# Patient Record
Sex: FEMALE | Race: ASIAN | URBAN - METROPOLITAN AREA
[De-identification: names, ages, dates, MRNs, and addresses within clinical notes are randomized per-mention and may not be internally consistent; named-entity substitution may affect disease eponyms.]

---

## 2019-02-28 ENCOUNTER — ANESTHESIA (OUTPATIENT)
Dept: OBGYN | Facility: CLINIC | Age: 29
End: 2019-02-28
Payer: COMMERCIAL

## 2019-02-28 ENCOUNTER — ANESTHESIA EVENT (OUTPATIENT)
Dept: OBGYN | Facility: CLINIC | Age: 29
End: 2019-02-28
Payer: COMMERCIAL

## 2019-02-28 ENCOUNTER — HOSPITAL ENCOUNTER (INPATIENT)
Facility: CLINIC | Age: 29
LOS: 2 days | Discharge: HOME | End: 2019-03-02
Attending: OBSTETRICS & GYNECOLOGY | Admitting: OBSTETRICS & GYNECOLOGY
Payer: COMMERCIAL

## 2019-02-28 PROBLEM — Z37.9 NORMAL LABOR: Status: ACTIVE | Noted: 2019-02-28

## 2019-02-28 PROBLEM — Z36.89 ENCOUNTER FOR TRIAGE IN PREGNANT PATIENT: Status: ACTIVE | Noted: 2019-02-28

## 2019-02-28 LAB
ABO + RH BLD: NORMAL
ABO + RH BLD: NORMAL
ALBUMIN SERPL-MCNC: 2.4 G/DL (ref 3.4–5)
ALBUMIN UR-MCNC: NEGATIVE MG/DL
ALP SERPL-CCNC: 189 U/L (ref 40–150)
ALT SERPL W P-5'-P-CCNC: 12 U/L (ref 0–50)
ANION GAP SERPL CALCULATED.3IONS-SCNC: 10 MMOL/L (ref 3–14)
APPEARANCE UR: CLEAR
AST SERPL W P-5'-P-CCNC: 14 U/L (ref 0–45)
BACTERIA #/AREA URNS HPF: ABNORMAL /HPF
BILIRUB SERPL-MCNC: 0.2 MG/DL (ref 0.2–1.3)
BILIRUB UR QL STRIP: NEGATIVE
BUN SERPL-MCNC: 6 MG/DL (ref 7–30)
CALCIUM SERPL-MCNC: 8.1 MG/DL (ref 8.5–10.1)
CHLORIDE SERPL-SCNC: 110 MMOL/L (ref 94–109)
CO2 SERPL-SCNC: 20 MMOL/L (ref 20–32)
COLOR UR AUTO: YELLOW
CREAT SERPL-MCNC: 0.52 MG/DL (ref 0.52–1.04)
GFR SERPL CREATININE-BSD FRML MDRD: >90 ML/MIN/{1.73_M2}
GLUCOSE SERPL-MCNC: 111 MG/DL (ref 70–99)
GLUCOSE UR STRIP-MCNC: NEGATIVE MG/DL
HBV SURFACE AG SERPL QL IA: NONREACTIVE
HGB UR QL STRIP: ABNORMAL
HIV 1+2 AB+HIV1 P24 AG SERPL QL IA: NONREACTIVE
KETONES UR STRIP-MCNC: NEGATIVE MG/DL
LEUKOCYTE ESTERASE UR QL STRIP: NEGATIVE
MUCOUS THREADS #/AREA URNS LPF: PRESENT /LPF
NITRATE UR QL: NEGATIVE
PH UR STRIP: 6 PH (ref 5–7)
POTASSIUM SERPL-SCNC: 3.3 MMOL/L (ref 3.4–5.3)
PROT SERPL-MCNC: 6.7 G/DL (ref 6.8–8.8)
RBC #/AREA URNS AUTO: 1 /HPF (ref 0–2)
SODIUM SERPL-SCNC: 140 MMOL/L (ref 133–144)
SOURCE: ABNORMAL
SP GR UR STRIP: 1.02 (ref 1–1.03)
SPECIMEN EXP DATE BLD: NORMAL
SQUAMOUS #/AREA URNS AUTO: <1 /HPF (ref 0–1)
UROBILINOGEN UR STRIP-MCNC: 0 MG/DL (ref 0–2)
WBC #/AREA URNS AUTO: 3 /HPF (ref 0–5)

## 2019-02-28 PROCEDURE — 0064U ANTB TP TOTAL&RPR IA QUAL: CPT | Performed by: OBSTETRICS & GYNECOLOGY

## 2019-02-28 PROCEDURE — 86900 BLOOD TYPING SEROLOGIC ABO: CPT | Performed by: OBSTETRICS & GYNECOLOGY

## 2019-02-28 PROCEDURE — 40000671 ZZH STATISTIC ANESTHESIA CASE

## 2019-02-28 PROCEDURE — 00HU33Z INSERTION OF INFUSION DEVICE INTO SPINAL CANAL, PERCUTANEOUS APPROACH: ICD-10-PCS | Performed by: ANESTHESIOLOGY

## 2019-02-28 PROCEDURE — 81001 URINALYSIS AUTO W/SCOPE: CPT | Performed by: OBSTETRICS & GYNECOLOGY

## 2019-02-28 PROCEDURE — 88307 TISSUE EXAM BY PATHOLOGIST: CPT | Performed by: OBSTETRICS & GYNECOLOGY

## 2019-02-28 PROCEDURE — 80053 COMPREHEN METABOLIC PANEL: CPT | Performed by: OBSTETRICS & GYNECOLOGY

## 2019-02-28 PROCEDURE — 86901 BLOOD TYPING SEROLOGIC RH(D): CPT | Performed by: OBSTETRICS & GYNECOLOGY

## 2019-02-28 PROCEDURE — 25000125 ZZHC RX 250: Performed by: OBSTETRICS & GYNECOLOGY

## 2019-02-28 PROCEDURE — 27110038 ZZH RX 271: Performed by: ANESTHESIOLOGY

## 2019-02-28 PROCEDURE — 37000011 ZZH ANESTHESIA WARD SERVICE

## 2019-02-28 PROCEDURE — 80307 DRUG TEST PRSMV CHEM ANLYZR: CPT | Performed by: OBSTETRICS & GYNECOLOGY

## 2019-02-28 PROCEDURE — G0463 HOSPITAL OUTPT CLINIC VISIT: HCPCS

## 2019-02-28 PROCEDURE — 87389 HIV-1 AG W/HIV-1&-2 AB AG IA: CPT | Performed by: OBSTETRICS & GYNECOLOGY

## 2019-02-28 PROCEDURE — 25000132 ZZH RX MED GY IP 250 OP 250 PS 637: Performed by: OBSTETRICS & GYNECOLOGY

## 2019-02-28 PROCEDURE — 25000132 ZZH RX MED GY IP 250 OP 250 PS 637

## 2019-02-28 PROCEDURE — 25000128 H RX IP 250 OP 636

## 2019-02-28 PROCEDURE — 36415 COLL VENOUS BLD VENIPUNCTURE: CPT | Performed by: OBSTETRICS & GYNECOLOGY

## 2019-02-28 PROCEDURE — 80353 DRUG SCREENING COCAINE: CPT | Performed by: OBSTETRICS & GYNECOLOGY

## 2019-02-28 PROCEDURE — 72200001 ZZH LABOR CARE VAGINAL DELIVERY SINGLE

## 2019-02-28 PROCEDURE — 12000000 ZZH R&B MED SURG/OB

## 2019-02-28 PROCEDURE — 88307 TISSUE EXAM BY PATHOLOGIST: CPT | Mod: 26 | Performed by: OBSTETRICS & GYNECOLOGY

## 2019-02-28 PROCEDURE — 87340 HEPATITIS B SURFACE AG IA: CPT | Performed by: OBSTETRICS & GYNECOLOGY

## 2019-02-28 PROCEDURE — 3E0R3BZ INTRODUCTION OF ANESTHETIC AGENT INTO SPINAL CANAL, PERCUTANEOUS APPROACH: ICD-10-PCS | Performed by: ANESTHESIOLOGY

## 2019-02-28 PROCEDURE — 25000128 H RX IP 250 OP 636: Performed by: ANESTHESIOLOGY

## 2019-02-28 PROCEDURE — 25800030 ZZH RX IP 258 OP 636: Performed by: ANESTHESIOLOGY

## 2019-02-28 RX ORDER — METHYLERGONOVINE MALEATE 0.2 MG/ML
200 INJECTION INTRAVENOUS
Status: DISCONTINUED | OUTPATIENT
Start: 2019-02-28 | End: 2019-02-28

## 2019-02-28 RX ORDER — NALOXONE HYDROCHLORIDE 0.4 MG/ML
.1-.4 INJECTION, SOLUTION INTRAMUSCULAR; INTRAVENOUS; SUBCUTANEOUS
Status: DISCONTINUED | OUTPATIENT
Start: 2019-02-28 | End: 2019-02-28 | Stop reason: HOSPADM

## 2019-02-28 RX ORDER — BISACODYL 10 MG
10 SUPPOSITORY, RECTAL RECTAL DAILY PRN
Status: DISCONTINUED | OUTPATIENT
Start: 2019-03-02 | End: 2019-03-02 | Stop reason: HOSPADM

## 2019-02-28 RX ORDER — OXYCODONE AND ACETAMINOPHEN 5; 325 MG/1; MG/1
1 TABLET ORAL
Status: DISCONTINUED | OUTPATIENT
Start: 2019-02-28 | End: 2019-02-28

## 2019-02-28 RX ORDER — OXYTOCIN 10 [USP'U]/ML
10 INJECTION, SOLUTION INTRAMUSCULAR; INTRAVENOUS
Status: DISCONTINUED | OUTPATIENT
Start: 2019-02-28 | End: 2019-03-02 | Stop reason: HOSPADM

## 2019-02-28 RX ORDER — FENTANYL CITRATE 50 UG/ML
INJECTION, SOLUTION INTRAMUSCULAR; INTRAVENOUS
Status: COMPLETED
Start: 2019-02-28 | End: 2019-02-28

## 2019-02-28 RX ORDER — ONDANSETRON 2 MG/ML
4 INJECTION INTRAMUSCULAR; INTRAVENOUS EVERY 6 HOURS PRN
Status: DISCONTINUED | OUTPATIENT
Start: 2019-02-28 | End: 2019-02-28 | Stop reason: CLARIF

## 2019-02-28 RX ORDER — CARBOPROST TROMETHAMINE 250 UG/ML
250 INJECTION, SOLUTION INTRAMUSCULAR
Status: DISCONTINUED | OUTPATIENT
Start: 2019-02-28 | End: 2019-03-02 | Stop reason: HOSPADM

## 2019-02-28 RX ORDER — AMOXICILLIN 250 MG
1 CAPSULE ORAL 2 TIMES DAILY
Status: DISCONTINUED | OUTPATIENT
Start: 2019-02-28 | End: 2019-03-02 | Stop reason: HOSPADM

## 2019-02-28 RX ORDER — ACETAMINOPHEN 325 MG/1
650 TABLET ORAL EVERY 4 HOURS PRN
Status: DISCONTINUED | OUTPATIENT
Start: 2019-02-28 | End: 2019-03-02 | Stop reason: HOSPADM

## 2019-02-28 RX ORDER — SODIUM CHLORIDE, SODIUM LACTATE, POTASSIUM CHLORIDE, CALCIUM CHLORIDE 600; 310; 30; 20 MG/100ML; MG/100ML; MG/100ML; MG/100ML
INJECTION, SOLUTION INTRAVENOUS CONTINUOUS
Status: DISCONTINUED | OUTPATIENT
Start: 2019-02-28 | End: 2019-02-28 | Stop reason: CLARIF

## 2019-02-28 RX ORDER — OXYTOCIN/0.9 % SODIUM CHLORIDE 30/500 ML
100-340 PLASTIC BAG, INJECTION (ML) INTRAVENOUS CONTINUOUS PRN
Status: COMPLETED | OUTPATIENT
Start: 2019-02-28 | End: 2019-02-28

## 2019-02-28 RX ORDER — METHYLERGONOVINE MALEATE 0.2 MG/ML
200 INJECTION INTRAVENOUS
Status: DISCONTINUED | OUTPATIENT
Start: 2019-02-28 | End: 2019-03-02 | Stop reason: HOSPADM

## 2019-02-28 RX ORDER — IBUPROFEN 800 MG/1
800 TABLET, FILM COATED ORAL
Status: COMPLETED | OUTPATIENT
Start: 2019-02-28 | End: 2019-02-28

## 2019-02-28 RX ORDER — ACETAMINOPHEN 325 MG/1
650 TABLET ORAL EVERY 4 HOURS PRN
Status: DISCONTINUED | OUTPATIENT
Start: 2019-02-28 | End: 2019-02-28

## 2019-02-28 RX ORDER — IBUPROFEN 600 MG/1
600 TABLET, FILM COATED ORAL EVERY 6 HOURS PRN
Status: DISCONTINUED | OUTPATIENT
Start: 2019-02-28 | End: 2019-03-02 | Stop reason: HOSPADM

## 2019-02-28 RX ORDER — OXYTOCIN/0.9 % SODIUM CHLORIDE 30/500 ML
2-30 PLASTIC BAG, INJECTION (ML) INTRAVENOUS CONTINUOUS
Status: DISCONTINUED | OUTPATIENT
Start: 2019-02-28 | End: 2019-03-02 | Stop reason: HOSPADM

## 2019-02-28 RX ORDER — FERROUS SULFATE 325(65) MG
325 TABLET ORAL DAILY
Status: DISCONTINUED | OUTPATIENT
Start: 2019-02-28 | End: 2019-03-02 | Stop reason: HOSPADM

## 2019-02-28 RX ORDER — MISOPROSTOL 200 UG/1
800 TABLET ORAL
Status: COMPLETED | OUTPATIENT
Start: 2019-02-28 | End: 2019-02-28

## 2019-02-28 RX ORDER — EPHEDRINE SULFATE 50 MG/ML
INJECTION, SOLUTION INTRAMUSCULAR; INTRAVENOUS; SUBCUTANEOUS
Status: DISCONTINUED
Start: 2019-02-28 | End: 2019-02-28 | Stop reason: HOSPADM

## 2019-02-28 RX ORDER — NALOXONE HYDROCHLORIDE 0.4 MG/ML
.1-.4 INJECTION, SOLUTION INTRAMUSCULAR; INTRAVENOUS; SUBCUTANEOUS
Status: DISCONTINUED | OUTPATIENT
Start: 2019-02-28 | End: 2019-02-28 | Stop reason: CLARIF

## 2019-02-28 RX ORDER — BUPIVACAINE HCL/0.9 % NACL/PF 0.125 %
PLASTIC BAG, INJECTION (ML) EPIDURAL CONTINUOUS
Status: DISCONTINUED | OUTPATIENT
Start: 2019-02-28 | End: 2019-02-28 | Stop reason: HOSPADM

## 2019-02-28 RX ORDER — OXYCODONE HYDROCHLORIDE 5 MG/1
5 TABLET ORAL EVERY 4 HOURS PRN
Status: DISCONTINUED | OUTPATIENT
Start: 2019-02-28 | End: 2019-03-02 | Stop reason: HOSPADM

## 2019-02-28 RX ORDER — OXYTOCIN/0.9 % SODIUM CHLORIDE 30/500 ML
340 PLASTIC BAG, INJECTION (ML) INTRAVENOUS CONTINUOUS PRN
Status: DISCONTINUED | OUTPATIENT
Start: 2019-02-28 | End: 2019-03-02 | Stop reason: HOSPADM

## 2019-02-28 RX ORDER — OXYTOCIN 10 [USP'U]/ML
10 INJECTION, SOLUTION INTRAMUSCULAR; INTRAVENOUS
Status: DISCONTINUED | OUTPATIENT
Start: 2019-02-28 | End: 2019-02-28 | Stop reason: CLARIF

## 2019-02-28 RX ORDER — LANOLIN 100 %
OINTMENT (GRAM) TOPICAL
Status: DISCONTINUED | OUTPATIENT
Start: 2019-02-28 | End: 2019-03-02 | Stop reason: HOSPADM

## 2019-02-28 RX ORDER — NALOXONE HYDROCHLORIDE 0.4 MG/ML
.1-.4 INJECTION, SOLUTION INTRAMUSCULAR; INTRAVENOUS; SUBCUTANEOUS
Status: DISCONTINUED | OUTPATIENT
Start: 2019-02-28 | End: 2019-03-02 | Stop reason: HOSPADM

## 2019-02-28 RX ORDER — AMOXICILLIN 250 MG
2 CAPSULE ORAL 2 TIMES DAILY
Status: DISCONTINUED | OUTPATIENT
Start: 2019-02-28 | End: 2019-03-02 | Stop reason: HOSPADM

## 2019-02-28 RX ORDER — MISOPROSTOL 200 UG/1
TABLET ORAL
Status: COMPLETED
Start: 2019-02-28 | End: 2019-02-28

## 2019-02-28 RX ORDER — NALBUPHINE HYDROCHLORIDE 10 MG/ML
2.5-5 INJECTION, SOLUTION INTRAMUSCULAR; INTRAVENOUS; SUBCUTANEOUS EVERY 6 HOURS PRN
Status: DISCONTINUED | OUTPATIENT
Start: 2019-02-28 | End: 2019-02-28 | Stop reason: HOSPADM

## 2019-02-28 RX ORDER — CARBOPROST TROMETHAMINE 250 UG/ML
250 INJECTION, SOLUTION INTRAMUSCULAR
Status: DISCONTINUED | OUTPATIENT
Start: 2019-02-28 | End: 2019-02-28 | Stop reason: CLARIF

## 2019-02-28 RX ORDER — EPHEDRINE SULFATE 50 MG/ML
5 INJECTION, SOLUTION INTRAMUSCULAR; INTRAVENOUS; SUBCUTANEOUS
Status: DISCONTINUED | OUTPATIENT
Start: 2019-02-28 | End: 2019-02-28 | Stop reason: HOSPADM

## 2019-02-28 RX ORDER — OXYTOCIN/0.9 % SODIUM CHLORIDE 30/500 ML
100 PLASTIC BAG, INJECTION (ML) INTRAVENOUS CONTINUOUS
Status: DISCONTINUED | OUTPATIENT
Start: 2019-02-28 | End: 2019-03-02 | Stop reason: HOSPADM

## 2019-02-28 RX ORDER — HYDROCORTISONE 2.5 %
CREAM (GRAM) TOPICAL 3 TIMES DAILY PRN
Status: DISCONTINUED | OUTPATIENT
Start: 2019-02-28 | End: 2019-03-02 | Stop reason: HOSPADM

## 2019-02-28 RX ORDER — BUPIVACAINE HCL/0.9 % NACL/PF 0.125 %
PLASTIC BAG, INJECTION (ML) EPIDURAL
Status: DISCONTINUED
Start: 2019-02-28 | End: 2019-02-28 | Stop reason: HOSPADM

## 2019-02-28 RX ADMIN — MISOPROSTOL 800 MCG: 200 TABLET ORAL at 15:25

## 2019-02-28 RX ADMIN — ACETAMINOPHEN 650 MG: 325 TABLET, FILM COATED ORAL at 21:11

## 2019-02-28 RX ADMIN — FENTANYL CITRATE 100 MCG: 50 INJECTION, SOLUTION INTRAMUSCULAR; INTRAVENOUS at 14:47

## 2019-02-28 RX ADMIN — IBUPROFEN 800 MG: 800 TABLET, FILM COATED ORAL at 17:29

## 2019-02-28 RX ADMIN — SODIUM CHLORIDE, POTASSIUM CHLORIDE, SODIUM LACTATE AND CALCIUM CHLORIDE: 600; 310; 30; 20 INJECTION, SOLUTION INTRAVENOUS at 14:03

## 2019-02-28 RX ADMIN — OXYTOCIN-SODIUM CHLORIDE 0.9% IV SOLN 30 UNIT/500ML 340 ML/HR: 30-0.9/5 SOLUTION at 15:21

## 2019-02-28 RX ADMIN — SENNOSIDES AND DOCUSATE SODIUM 2 TABLET: 8.6; 5 TABLET ORAL at 21:04

## 2019-02-28 RX ADMIN — FERROUS SULFATE TAB 325 MG (65 MG ELEMENTAL FE) 325 MG: 325 (65 FE) TAB at 21:04

## 2019-02-28 RX ADMIN — IBUPROFEN 600 MG: 600 TABLET ORAL at 23:34

## 2019-02-28 RX ADMIN — Medication: at 14:34

## 2019-02-28 ASSESSMENT — MIFFLIN-ST. JEOR: SCORE: 1371.59

## 2019-02-28 ASSESSMENT — ENCOUNTER SYMPTOMS: DYSRHYTHMIAS: 0

## 2019-02-28 NOTE — PLAN OF CARE
Data: Patient presented to [facility]: 2019 12:14 PM.  Reason for maternal/fetal assessment is uterine contractions, vaginal bleeding. Patient reports contractions started this am but she noted vaginal spotting last evening.  Patient is a .  She stated that she had an ultrasound around 9 weeks at [facility] and was uninsured so she didn't have prenatal care.  Prenatal record at [facility] reviewed. Pregnancy  has been complicated by first trimester cocaine use documented at [facility] and no prenatal care.  Gestational Age 39w0d. Patient was found to be hypertensive. Fetal movement present. Patient denies leaking of vaginal fluid/rupture of membranes, pelvic pressure, nausea, vomiting, headache, visual disturbances, epigastric or URQ pain, significant edema. Support person is present.   Action: Verbal consent for EFM. Triage assessment completed. Bill of rights reviewed.  Response: Patient verbalized agreement with plan. Will contact Dr Aziza Shi with update and further orders.

## 2019-02-28 NOTE — PLAN OF CARE
While discussing her pain management options, patient admitted to using cocaine during the first trimester.  She denied current use.

## 2019-02-28 NOTE — ANESTHESIA PROCEDURE NOTES
Peripheral nerve/Neuraxial procedure note : epidural catheter  Pre-Procedure  Performed by Aaron Guevara DO  Referred by MD KSENIA  Location: OB      Pre-Anesthestic Checklist: patient identified, IV checked, risks and benefits discussed, informed consent, monitors and equipment checked, pre-op evaluation and at physician/surgeon's request    Timeout  Correct Patient: Yes   Correct Procedure: Yes   Correct Site: Yes   Correct Laterality: N/A   Correct Position: Yes   Site Marked: N/A   .   Procedure Documentation    .    Procedure:    Epidural catheter.     .  .       Assessment/Narrative  .  .  . Comments:  .Diagnosis- Anticipated vaginal delivery    Continuous Labor Epidural, indication is for labor pain. Following an discussion of the expectations, benefits and risk (including but not limited to nerve damage, infection, bleeding, spinal headache, partial or failed block)--questions were encouraged and answered. The patient appears to understand, and consents to proceed.     The patient received a fluid bolus per orders    Time-out performed.     The patient was in the sitting position, a PVP prep times three was done in the lumbar area followed by placement of a sterile drape. The skin was then infiltrated with lidocaine. A 17ga Touhy needle was then advanced under a loss of resistance technique until the epidural space was identified. The epidural catheter was then advanced and secured. The catheter was then bolused in divided doses with Bupivicaine 0.25% 12 cc plus Fentanyl 100mcg (2cc), while the patient/fetus was monitored. Infusion orders written and infusion of 0.125% bupivicaine 15cc per hour started.    I or my partner, was immediately available and frequently monitored at necessary intervals.      MARIELA Guevara

## 2019-02-28 NOTE — PROGRESS NOTES
PROGRESS NOTE  -late entry note from 1400 hrs    Lab work up reviewed  Pt now accepting she did use cocaine during pregnancy, states last time during early pregnancy, although she is still positive for cocaine in her UDS.  Rest of lab work up is consistent for electrolyte imbalance most likely due to dehydration  Anemia that was present during initial prenatal visit  - states she did not gain enough weight during this pregnancy  - most likely malnutrition and iron deficiency    - SW consult  - iron supplementation to be started  - continue IV hydration  - cord segment to be collected at time of delivery  - gHTN without severe features as the most likely diagnosis of her elevated BPs  - IUGR  - insufficient prenatal care  - poor social situation  - Tobacco use    Dr. Jarret Shi  [phone]

## 2019-02-28 NOTE — L&D DELIVERY NOTE
DELIVERY NOTE  Yahaira Jenkins is a 29 year old  at 39w0d admitted with first stage of labor, early, no prenatal care, cocaine use during pregnancy, tobacco use, malnutrition, chronic anemia, gHTN and IUGR at bedside ultrasound done today.  under epidural, liveborn female infant, OA position, clear fluid, over intact perineum. Baby info as above. Nose and oropharynx bulb suctioned at maternal abdomen. Both shoulders delivered without complication. There was  no nuchal cord. Baby was then handed to awaiting pediatricians. Placenta spontaneously delivered intact with 3VC. Upon gross examination of placenta there was no evidence of abruption, hematoma, however, there was a small cystic formation that did not seem to communicate with the amnios were baby was in. No cervical, periurethral, vagina or perineal lacerations were present. EBL = 708 secondary to atony, after fundal massage, pitocin 40 mU in 1000 ml of LR and Misoprostol 800 mcg rectally her bleeding got under complete control. Counts correct. Dispo stable in LDR.     - SW consulted  - Hb tomorrow am  - started on iron supplementation  - will need Tdap    Dr. Jarret Shi  [phone]

## 2019-02-28 NOTE — ANESTHESIA PREPROCEDURE EVALUATION
Anesthesia Pre-Procedure Evaluation    Patient: Yahaira Jenkins   MRN: [id number] : 1990          Preoperative Diagnosis: * No surgery found *        Past Medical History:   Diagnosis Date     Abnormal Pap smear 2012     Anemia      Past Surgical History:   Procedure Laterality Date     DILATION AND EVACUATION       Anesthesia Evaluation     .             ROS/MED HX    ENT/Pulmonary:      (-) asthma, sleep apnea and Other pulmonary disease   Neurologic:      (-) Other neuro hx, Dementia and Neuropathy   Cardiovascular:        (-) hypertension, CAD, CHF, arrhythmias, pulmonary hypertension and dyslipidemia   METS/Exercise Tolerance:     Hematologic:     (+) Anemia, -      Musculoskeletal:        (-) arthritis   GI/Hepatic:     (+) GERD      (-) hepatitis   Renal/Genitourinary:      (-) renal disease   Endo:      (-) Type I DM, Type II DM, thyroid disease, chronic steroid usage, other endocrine disorder and obesity   Psychiatric:        (-) psychiatric history   Infectious Disease:  - neg infectious disease ROS       Malignancy:         Other:    (+) no H/O Chronic Pain,                        Physical Exam  Normal systems: cardiovascular and pulmonary    Airway   Mallampati: II  TM distance: >3 FB  Neck ROM: full  Mouth opening: > 3 cm    Dental     Cardiovascular   Rhythm and rate: regular and normal  (-) no murmur    Pulmonary     Other findings: Lab Test        12                       0630          HGB          8.6*           Lab Test        19                       1305          NA           140           POTASSIUM    3.3*          CHLORIDE     110*          CO2          20            BUN          6*            CR           0.52          ANIONGAP     10            STAR          8.1*          GLC          111*                Lab Results   Component Value Date    HGB 8.6 (L) 2012     2019    POTASSIUM 3.3 (L) 2019    CHLORIDE 110 (H) 2019    CO2 20  "02/28/2019    BUN 6 (L) 02/28/2019    CR 0.52 02/28/2019     (H) 02/28/2019    STAR 8.1 (L) 02/28/2019    ALBUMIN 2.4 (L) 02/28/2019    PROTTOTAL 6.7 (L) 02/28/2019    ALT 12 02/28/2019    AST 14 02/28/2019    ALKPHOS 189 (H) 02/28/2019    BILITOTAL 0.2 02/28/2019       Preop Vitals  BP Readings from Last 3 Encounters:   02/28/19 (!) 154/98   04/21/12 111/77   04/09/12 123/77    Pulse Readings from Last 3 Encounters:   04/21/12 65   04/09/12 87   01/22/12 91      Resp Readings from Last 3 Encounters:   04/21/12 20   04/09/12 16    SpO2 Readings from Last 3 Encounters:   No data found for SpO2      Temp Readings from Last 1 Encounters:   04/21/12 97.7  F (36.5  C) (Oral)    Ht Readings from Last 1 Encounters:   02/28/19 1.588 m (5' 2.5\")      Wt Readings from Last 1 Encounters:   02/28/19 68 kg (150 lb)    Estimated body mass index is 27 kg/m  as calculated from the following:    Height as of this encounter: 1.588 m (5' 2.5\").    Weight as of this encounter: 68 kg (150 lb).       Anesthesia Plan      History & Physical Review      ASA Status:  2 .  OB Epidural Asa: 2   NPO Status:  > 2 hours    Plan for Epidural          Postoperative Care      Consents  Anesthetic plan, risks, benefits and alternatives discussed with:  Patient..                 Eric Keys MD                    .  "

## 2019-02-28 NOTE — PLAN OF CARE
Data: Yahaira Jenkins transferred to [facility] via wheelchair at 1740. Baby transferred via parent's arms.  Action: Receiving unit notified of transfer: Yes. Patient and family notified of room change. Report given to Meghna CUEVA RN at 1740. Belongings sent to receiving unit. Accompanied by Registered Nurse. Oriented patient to surroundings. Call light within reach. ID bands double-checked with receiving RN.  Response: Patient tolerated transfer and is stable.

## 2019-02-28 NOTE — H&P
"  February 28, 2019    Yahaira Jenkins  [id number]            OB Admit History & Physical      Ms. Jenkins  is here for rule out labor and vaginal bleeding.    She has noticed uterine contractions that started this morning and have been getting stronger and more frequent. States she also noticed minimal bloody show yesterday evening and this morning. She has not had heavy vaginal bleeding at all. Has not had abnormal discharge and there is +FM..    Of note pt has hx of one OB visit when she established prenatal care back in 07/2018 at [facility]. She had at that point all prenatal work up done which was WNL. She had a dating early US that was consistent for a CRL at 9w0d GA back in 08/2018 and ALEJA was 03/07/2019. She was unfortunately, unable to continue prenatal care throughout this pregnancy. Denies any complications in prior pregnancies. She was induced in her last pregnancy for personal reasons, (elective induction). She is a smoker and states she tried to decrease cigarettes throughout this prengancy currently smoking about 3-4 cig/day.     I personally reviewed her one visit in care everywhere at [facility], I also personally evaluated her during her triage encounter and she was found to have elevated pressures in the mild range. She denies Ha, SOB, CP, LH, RUQ or epigastric pain, scotomata or increased swelling. She has never had blood pressure issues in prior pregnancies or outside pregnancy.     States that the only medication she was on during this pregnancy was PNV's on and off. She had a UDS done during her initial prenatal visit that had presumed cocaine positive. She denies any drug use.     No LMP recorded. Patient is pregnant.   Her Estimated Date of Delivery: Mar 7, 2019, making her 39w0d.      Estimated body mass index is 26.52 kg/m  as calculated from the following:    Height as of 4/19/12: 1.575 m (5' 2\").    Weight as of 4/19/12: 65.8 kg (145 lb).    See prenatal for labs.  unk " GBS, Rubella Immune, RH Positive      She is a 29 year old   Her OB history:   Obstetric History       T2      L2     SAB1   TAB0   Ectopic0   Multiple0   Live Births2       # Outcome Date GA Lbr Willy/2nd Weight Sex Delivery Anes PTL Lv   4 Current            3 Term 12 40w0d 03:30 / 00:19 3.487 kg (7 lb 11 oz) F Vag-Spont EPI N MERE      Name: KODY INGRAM      Apgar1:  8               Apgar5: 10   2 SAB  10w0d          1 Term 2007 40w0d  3.544 kg (7 lb 13 oz)  Vag-Spont EPI  MERE          Past Medical History:   Diagnosis Date     Abnormal Pap smear 2012     Anemia         No past surgical history on file.      No current outpatient medications on file.       Allergies: Patient has no known allergies.      REVIEW OF SYSTEMS:  NEUROLOGIC:  Negative  EYES:  Negative  ENT:  Negative  GI:  Negative  :  Negative  GYN:  Negative  CV:  Negative  PULMONARY:  Negative  MUSCULOSKELETAL:  Negative  PSYCH:  Negative      Social History     Socioeconomic History     Marital status: Single     Spouse name: Not on file     Number of children: Not on file     Years of education: Not on file     Highest education level: Not on file   Occupational History     Not on file   Social Needs     Financial resource strain: Not on file     Food insecurity:     Worry: Not on file     Inability: Not on file     Transportation needs:     Medical: Not on file     Non-medical: Not on file   Tobacco Use     Smoking status: Current Every Day Smoker     Packs/day: 0.25     Last attempt to quit: 2011     Years since quittin.5     Smokeless tobacco: Former User   Substance and Sexual Activity     Alcohol use: No     Drug use: Not on file     Sexual activity: Yes     Partners: Male   Lifestyle     Physical activity:     Days per week: Not on file     Minutes per session: Not on file     Stress: Not on file   Relationships     Social connections:     Talks on phone: Not on file     Gets together: Not on  file     Attends Druze service: Not on file     Active member of club or organization: Not on file     Attends meetings of clubs or organizations: Not on file     Relationship status: Not on file     Intimate partner violence:     Fear of current or ex partner: Not on file     Emotionally abused: Not on file     Physically abused: Not on file     Forced sexual activity: Not on file   Other Topics Concern     Not on file   Social History Narrative     Not on file      No family history on file.      Exam:    Vitals:   BP (!) 146/96   FHT: Reactive    [place]:  q2-3 min    Alert Awake in NAD  HEENT grossly normal  Neck: no lymphadenopathy or thryoidomegaly  Lungs CTAB  Back No CVAT  Heart RR  ABD gravid, NABS, soft, NT on exam with NT fundus palpable  Cervix is 5 cm / 80 % effaced at -3 station, ballotable  EXT:  no edema, no calf tenderness    BSUS  Cephalic, posterior fundal placenta, subjectively normal PSARKLE, MVP of 4.7 cm, Cardiac activity present, EFW is limited based on FL, AC, and HL, fetal head low in pelvis therefore no planes were able to be obtained. Measurements as follows;  - AC 28.1 cm - 32wd GA  - FL 6.50 cm - 33w4d GA  - HL 5.93 cm - 31w4d GA    In average EFW is 2075g consistent with a GA between 33-34 wks. This is consistent with IUGR.       Assessment:    1)  IUP at 39w0d GA  2) early labor  3) PIH  4) insufficient prenatal care  5) smoker  6) positive UDS in early pregnancy  7) IUGR    Plan:    1)  Admission orders in  - RPR, HIV, Hep B antigen, CMP, CBC, ABO type and screen ordered, UA, Urine culture, UDS, cr/prot ratio    2) AOL secondary to gHTN VS pre-E without severe features so far  - I counseled pt her diagnosis will be defined based on lab work up, management could also change and would need prompt delivery if severe features are present in lab work up  - antihypertensive management in case of possible severe range BPs   - counseled for possible use of magnesium sulfate if necessary for  seizure ppx    3) counseled for IUGR  - likely etiologies linked to tobacco use, insufficient parental care, cocaine use  - will need NICU team at deliver time     4) Will need SW consult     All questions answered and pt verbalized understanding.    Plan; transfer pt to LD and initiate pitocin for AOL      Aziza Shi MD  Dept of OB/GYN  February 28, 2019

## 2019-02-28 NOTE — PROVIDER NOTIFICATION
02/28/19 1249   Provider Notification   Provider Name/Title KSENIA   Method of Notification At Bedside   U/S was performed.  VTX presenting with fundal placenta noted.  Suspected IUGR.  POC was discussed and patient agreed to staying for labor.  Questions answered regarding hypertension/preeclampsia.

## 2019-03-01 LAB
ALT SERPL W P-5'-P-CCNC: 11 U/L (ref 0–50)
AST SERPL W P-5'-P-CCNC: 19 U/L (ref 0–45)
ERYTHROCYTE [DISTWIDTH] IN BLOOD BY AUTOMATED COUNT: 15.2 % (ref 10–15)
HCT VFR BLD AUTO: 23.6 % (ref 35–47)
HGB BLD-MCNC: 7.9 G/DL (ref 11.7–15.7)
HGB BLD-MCNC: 8.1 G/DL (ref 11.7–15.7)
MCH RBC QN AUTO: 22.7 PG (ref 26.5–33)
MCHC RBC AUTO-ENTMCNC: 34.3 G/DL (ref 31.5–36.5)
MCV RBC AUTO: 66 FL (ref 78–100)
PLATELET # BLD AUTO: 323 10E9/L (ref 150–450)
RBC # BLD AUTO: 3.57 10E12/L (ref 3.8–5.2)
T PALLIDUM AB SER QL: NONREACTIVE
WBC # BLD AUTO: 10 10E9/L (ref 4–11)

## 2019-03-01 PROCEDURE — 25000132 ZZH RX MED GY IP 250 OP 250 PS 637: Performed by: OBSTETRICS & GYNECOLOGY

## 2019-03-01 PROCEDURE — 12000000 ZZH R&B MED SURG/OB

## 2019-03-01 PROCEDURE — 85018 HEMOGLOBIN: CPT | Performed by: OBSTETRICS & GYNECOLOGY

## 2019-03-01 PROCEDURE — 84450 TRANSFERASE (AST) (SGOT): CPT | Performed by: OBSTETRICS & GYNECOLOGY

## 2019-03-01 PROCEDURE — 85027 COMPLETE CBC AUTOMATED: CPT | Performed by: OBSTETRICS & GYNECOLOGY

## 2019-03-01 PROCEDURE — 36415 COLL VENOUS BLD VENIPUNCTURE: CPT | Performed by: OBSTETRICS & GYNECOLOGY

## 2019-03-01 PROCEDURE — 84460 ALANINE AMINO (ALT) (SGPT): CPT | Performed by: OBSTETRICS & GYNECOLOGY

## 2019-03-01 RX ADMIN — IBUPROFEN 600 MG: 600 TABLET ORAL at 13:51

## 2019-03-01 RX ADMIN — IBUPROFEN 600 MG: 600 TABLET ORAL at 20:29

## 2019-03-01 RX ADMIN — FERROUS SULFATE TAB 325 MG (65 MG ELEMENTAL FE) 325 MG: 325 (65 FE) TAB at 07:51

## 2019-03-01 RX ADMIN — ACETAMINOPHEN 650 MG: 325 TABLET, FILM COATED ORAL at 19:48

## 2019-03-01 RX ADMIN — SENNOSIDES AND DOCUSATE SODIUM 1 TABLET: 8.6; 5 TABLET ORAL at 07:50

## 2019-03-01 RX ADMIN — IBUPROFEN 600 MG: 600 TABLET ORAL at 07:49

## 2019-03-01 RX ADMIN — ACETAMINOPHEN 650 MG: 325 TABLET, FILM COATED ORAL at 01:27

## 2019-03-01 RX ADMIN — ACETAMINOPHEN 650 MG: 325 TABLET, FILM COATED ORAL at 10:20

## 2019-03-01 RX ADMIN — SENNOSIDES AND DOCUSATE SODIUM 1 TABLET: 8.6; 5 TABLET ORAL at 07:57

## 2019-03-01 RX ADMIN — ACETAMINOPHEN 650 MG: 325 TABLET, FILM COATED ORAL at 06:04

## 2019-03-01 RX ADMIN — SENNOSIDES AND DOCUSATE SODIUM 2 TABLET: 8.6; 5 TABLET ORAL at 19:48

## 2019-03-01 RX ADMIN — ACETAMINOPHEN 650 MG: 325 TABLET, FILM COATED ORAL at 13:51

## 2019-03-01 NOTE — PLAN OF CARE
Elevated blood pressures noted during initial assessment this morning.  Elevated blood pressures have continued throughout the shift running in the 140s-130s over 100s-90s.  Dr Varela notified, labs ordered.  VS now every 4 hours, call if over 150 systolic or 100 diastolic x 2, 15 minutes apart per Dr. Varela. Discussed smoking/nicotine patch - patch declined by pt. Parents bonding well with baby and attentive to her needs.  Pt is formula feeding but discussed possibly breastfeeding when Dr. Varela rounded.  Pt will call if breastfeeding assistance is desired.

## 2019-03-01 NOTE — PROGRESS NOTES
Public Health Nurse (PHN) met with patient and FOB, discussed resources within [facility] and [facility].  Provided family resources of [facility] Public Health services resource card, home visiting card, community resource guide and car seat information card given and discussed. Discussed how to self-refer to [facility]. Patient declined any questions or concerns.

## 2019-03-01 NOTE — PLAN OF CARE
VSS. Patient up ad carlos and is independent to void. Pain is well controlled with ibuprofen. SW consult placed d/t cocaine use, smoking, and limited prenatal care during pregnancy. Significant other present at bedside.

## 2019-03-01 NOTE — PROGRESS NOTES
"PPD #1    Yahaira Jenkins is a 29 year old  s/p  yesterday.  No complaints.   Denies headache, epigastric or RUQ pain, edema, visual change.  BPs mildly elevated.  Only had one prenatal visit that I can see in 2018, BP was normal, Urine tox was positive for cocaine then as well.    hgb today 7.9.  I did see a hemoglobin ELP in  [facility] records, hgb E=93%.    Will check cbc and LFTs to be sure no HELLP syndrome.        OBJECTIVE:  Blood pressure (!) 135/98, temperature 97.9  F (36.6  C), temperature source Oral, resp. rate 16, height 1.588 m (5' 2.5\"), weight 68 kg (150 lb), unknown if currently breastfeeding.    WDWN woman in NAD   FF U -2    Hemoglobin   Date Value Ref Range Status   2019 7.9 (L) 11.7 - 15.7 g/dL Final   2012 8.6 (L) 11.7 - 15.7 g/dL Final       ASSESSMENT:  S/P .  Mild hypertension  Cocaine use  Hgb E  Blood loss anemia vs chronic or gestational anemia  Active Problems:    Encounter for triage in pregnant patient (2019)    Normal labor (2019)    Vaginal delivery (2019)        PLAN:  On Fe supplement.  SW involved, baby on 5 day hold.    Monitor BP, check LFTs and CBC.  Discussed contraceptive options.  Pelvic rest advised x 6 weeks.    Continue post partum cares.  Anticipate DC tomorrow.    Carlyn Varela MD 2019   "

## 2019-03-01 NOTE — PROVIDER NOTIFICATION
03/01/19 1039   Provider Notification   Provider Name/Title Jaye   Method of Notification In Department   Notification Reason Vital Signs Change;Status Update   Notified of elevated blood pressures during rounds.  MD will evaluate and update POC if needed.

## 2019-03-01 NOTE — CONSULTS
"      [facility]  MATERNAL CHILD HEALTH   INITIAL PSYCHOSOCIAL ASSESSMENT     DATA:     Reason for Social Work Consult: Minimal prenatal care, tobacco use, + Cocaine use     Presenting Information: Pt Yahaira delivered her 3rd child on 2/28, baby girl Norma Abdullahi. Pt also has an 11-year-old son and a 6-year-old daughter. Pt tested positive for cocaine at delivery. Baby's tox screen is still pending.     Living Situation: Pt resides in a home in [place] with her significant other \"Jesus\" and their 2 children.    Social Support: Pt identifies her mother as a support to her, as well as Jesus's family who resides in the area. Pt's mother Sarah resides in [place] and is currently watching patients 2 children at home.    Employment: Pt is not currently employed, and plans to stay at home with the baby for a few months before seeking employment. LORENZA Lee is employed full-time.     Insurance: Pt reports recently applying for [facility].     Source of Financial Support: LORENZA Lee employed full-time to support the family.     Mental Health History: None identified.    History of Postpartum Mood Disorders: Pt reports that she did not experience any PPD with her first 2 children. She has not yet completed the EPDS, but plans to do so before discharge. No concerns with PPD symptoms noted.     Chemical Health History: Pt tested positive for Cocaine at delivery. CPS report made to [facility]. Pt aware.     Community Resources//Baby Supplies: Pt reports that they have adequate supplies at home for the baby. She plans to stay at home with the baby for at least a few months, and the older children are in school. Day Care not needed at this time.     INTERVENTION:       SW completed chart review and collaborated with the multidisciplinary team.     Psychosocial Assessment     Introduction to Maternal Child Health  role and scope of practice     Reviewed Hospital and Community Resources "     Assessed Chemical Health History and Current Symptoms     Assessed Mental Health History and Current Symptoms     Identified stressors, barriers and family concerns     Provided support and active empathetic listening and validation.    Provided psychoeducation on  mood and anxiety disorders, assessed for any current symptoms or history    Provided brochure Depression and Anxiety During and after Pregnancy.     ASSESSMENT:     Coping: Adequate    Affect: Appropriate, stable, calm    Motivation/Ability to Access Services: Independent in accessing services. PHN referral not made at this time. Pt was given information and plans to self-refer at a later time.     Assessment of Support System: Stable, involved. FOB at bedside and assisting with infant cares. Pt's mother currently watching after pt's other two children.     Family and parent/infant interactions: Parents seem supportive of each other and are bonding with pt as they are able.      PLAN:     SW will continue to follow throughout pt's Maternal-Child Health Journey as needs arise. SW will continue to collaborate with the multidisciplinary team.    JAMES Goff

## 2019-03-01 NOTE — PROGRESS NOTES
Ying from CPS will come to assess pt Monday Morning as baby will be here for 5 days and pt is complaint to hospitalization plan at this time. Contact info for Ying from CPS: [phone]. CPS after hours number to call for any concerns or urgent needs over the weekend: [phone]   resilient/elastic

## 2019-03-01 NOTE — PLAN OF CARE
Stable patient, vitals and fundal checks are WNL. Pt is independent with self and infant cares. Up ambulating in room and is able to empty bladder without issues. Attentive to infant and is bonding well. Significant other is present and supportive.
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
## 2019-03-01 NOTE — ANESTHESIA POSTPROCEDURE EVALUATION
Patient: Yahaira Jenkins    * No procedures listed *    Diagnosis:* No pre-op diagnosis entered *  Diagnosis Additional Information: .Intrauterine Pregnancy, Labor      Anesthesia Type:  Epidural    Note:  Anesthesia Post Evaluation    Patient location during evaluation: Floor  Patient participation: Able to fully participate in evaluation  Level of consciousness: awake  Pain management: adequate  Airway patency: patent  Cardiovascular status: acceptable  Respiratory status: acceptable  Hydration status: euvolemic  PONV: controlled     Anesthetic complications: None    Comments: S/P epidural for labor. I or my partner remained immediately available, monitored the patient, and supervised nursing staff at key events and necessary intervals.    The patient is doing well. VSS Temp normal. Satisfactory cardiovascular and repiratory function. Neuro at baseline. Denies positional headache. Minimal side effects easily managed w/ PRN meds. No apparent anesthetic complications. No follow-up required.    [email or web address]        Last vitals:  Vitals:    03/01/19 0123 03/01/19 0800 03/01/19 0830   BP: 128/81 (!) 147/104 (!) 144/97   Resp: 16 16    Temp: 98.9  F (37.2  C) 97.9  F (36.6  C)          Electronically Signed By: Eric Keys MD  March 1, 2019  8:41 AM

## 2019-03-02 VITALS
HEIGHT: 63 IN | TEMPERATURE: 98.5 F | SYSTOLIC BLOOD PRESSURE: 139 MMHG | BODY MASS INDEX: 26.58 KG/M2 | WEIGHT: 150 LBS | DIASTOLIC BLOOD PRESSURE: 85 MMHG | RESPIRATION RATE: 16 BRPM

## 2019-03-02 PROCEDURE — 25000132 ZZH RX MED GY IP 250 OP 250 PS 637: Performed by: OBSTETRICS & GYNECOLOGY

## 2019-03-02 RX ORDER — IBUPROFEN 600 MG/1
600 TABLET, FILM COATED ORAL EVERY 6 HOURS PRN
Qty: 90 TABLET | Refills: 1 | Status: SHIPPED | OUTPATIENT
Start: 2019-03-02

## 2019-03-02 RX ORDER — FERROUS SULFATE 325(65) MG
325 TABLET, DELAYED RELEASE (ENTERIC COATED) ORAL DAILY
Qty: 60 TABLET | Refills: 0 | Status: SHIPPED | OUTPATIENT
Start: 2019-03-02 | End: 2019-03-02

## 2019-03-02 RX ORDER — AMOXICILLIN 250 MG
1 CAPSULE ORAL 2 TIMES DAILY PRN
Qty: 60 TABLET | Refills: 0 | Status: SHIPPED | OUTPATIENT
Start: 2019-03-02

## 2019-03-02 RX ORDER — ACETAMINOPHEN 325 MG/1
325-650 TABLET ORAL EVERY 6 HOURS PRN
Qty: 100 TABLET | Refills: 0 | Status: SHIPPED | OUTPATIENT
Start: 2019-03-02 | End: 2019-03-02

## 2019-03-02 RX ORDER — AMOXICILLIN 250 MG
1 CAPSULE ORAL 2 TIMES DAILY PRN
Qty: 60 TABLET | Refills: 1 | Status: SHIPPED | OUTPATIENT
Start: 2019-03-02 | End: 2019-03-02

## 2019-03-02 RX ORDER — ACETAMINOPHEN 325 MG/1
325-650 TABLET ORAL EVERY 6 HOURS PRN
Qty: 100 TABLET | Refills: 0 | Status: SHIPPED | OUTPATIENT
Start: 2019-03-02

## 2019-03-02 RX ORDER — FERROUS SULFATE 325(65) MG
325 TABLET, DELAYED RELEASE (ENTERIC COATED) ORAL DAILY
Qty: 60 TABLET | Refills: 0 | Status: SHIPPED | OUTPATIENT
Start: 2019-03-02

## 2019-03-02 RX ADMIN — SENNOSIDES AND DOCUSATE SODIUM 1 TABLET: 8.6; 5 TABLET ORAL at 08:29

## 2019-03-02 RX ADMIN — ACETAMINOPHEN 650 MG: 325 TABLET, FILM COATED ORAL at 04:31

## 2019-03-02 RX ADMIN — ACETAMINOPHEN 650 MG: 325 TABLET, FILM COATED ORAL at 00:10

## 2019-03-02 RX ADMIN — ACETAMINOPHEN 650 MG: 325 TABLET, FILM COATED ORAL at 13:15

## 2019-03-02 RX ADMIN — ACETAMINOPHEN 650 MG: 325 TABLET, FILM COATED ORAL at 08:28

## 2019-03-02 RX ADMIN — IBUPROFEN 600 MG: 600 TABLET ORAL at 10:10

## 2019-03-02 RX ADMIN — IBUPROFEN 600 MG: 600 TABLET ORAL at 04:09

## 2019-03-02 RX ADMIN — FERROUS SULFATE TAB 325 MG (65 MG ELEMENTAL FE) 325 MG: 325 (65 FE) TAB at 08:29

## 2019-03-02 ASSESSMENT — ACTIVITIES OF DAILY LIVING (ADL)
AMBULATION: 0-->INDEPENDENT
RETIRED_EATING: 0-->INDEPENDENT
SWALLOWING: 0-->SWALLOWS FOODS/LIQUIDS WITHOUT DIFFICULTY
TOILETING: 0-->INDEPENDENT
COGNITION: 0 - NO COGNITION ISSUES REPORTED
DRESS: 0-->INDEPENDENT
BATHING: 0-->INDEPENDENT
FALL_HISTORY_WITHIN_LAST_SIX_MONTHS: NO
TRANSFERRING: 0-->INDEPENDENT
RETIRED_COMMUNICATION: 0-->UNDERSTANDS/COMMUNICATES WITHOUT DIFFICULTY

## 2019-03-02 NOTE — DISCHARGE INSTRUCTIONS
"[facility]  [address] [place] [id number]   [phone]    **Call and schedule post-partum visit in 6 weeks.      Yahaira-    Congratulations on your new baby!    A few instructions:    -No tampons/douching/intercourse x 6 weeks.  See your health care provider first, to be sure you are healed.  -If you have stitches, it may help you feel better sooner ifyou can sit in the tub twice daily for a few minutes, for the first week after delivery.  Keep the area clean and dry.  -Call your clinic if fever, worsening pain, or any questions.    Medications:  Ibuprofen 600 mg every 6 hours will be helpful for pain and cramping.  You may want to use a stool softener if needed, available over the counter, like Colace 100 mg twice daily.    Post partum depression:   10-15% of women will have more than the baby blues--if you are feeling very sad or down, having regrets, can't stop thinking about certain thoughts or actions, you could be experiencing post partum depression.  Please call your health care provider to discuss.    Follow up:  Please call your clinic soon to schedule a \"post partum visit\" for 6 weeks from now.   If you need anything by prescription for contraception, they will go over it with you at that visit.    Again, congratulations!      Carlyn Varela MD March 2, 2019       Preeclampsia   Call your doctor right away if you have any of the following:  - Edema (swelling) in your face or hands  - Rapid weight gain-about 1 pound or more in a day  - Headache  - Abdominal pain on your right side  - Vision problems (flashes or spots)  - You have questions or concerns once you return home.    Postpartum Vaginal Delivery Instructions    Activity       Ask family and friends for help when you need it.    Do not place anything in your vagina for 6 weeks.    You are not restricted on other activities, but take it easy for a few weeks to allow your body to recover from delivery.  You are able to " do any activities you feel up to that point.    No driving until you have stopped taking your pain medications (usually two weeks after delivery).     Call your health care provider if you have any of these symptoms:       Increased pain, swelling, redness, or fluid around your stiches from an episiotomy or perineal tear.    A fever above 100.4 F (38 C) with or without chills when placing a thermometer under your tongue.    You soak a sanitary pad with blood within 1 hour, or you see blood clots larger than a golf ball.    Bleeding that lasts more than 6 weeks.    Vaginal discharge that smells bad.    Severe pain, cramping or tenderness in your lower belly area.    A need to urinate more frequently (use the toilet more often), more urgently (use the toilet very quickly), or it burns when you urinate.    Nausea and vomiting.    Redness, swelling or pain around a vein in your leg.    Problems breastfeeding or a red or painful area on your breast.    Chest pain and cough or are gasping for air.    Problems coping with sadness, anxiety, or depression.  If you have any concerns about hurting yourself or the baby, call your provider immediately.     You have questions or concerns after you return home.     Keep your hands clean:  Always wash your hands before touching your perineal area and stitches.  This helps reduce your risk of infection.  If your hands aren't dirty, you may use an alcohol hand-rub to clean your hands. Keep your nails clean and short.

## 2019-03-02 NOTE — PLAN OF CARE
BP mildly elevated. Denies any s/s of pre-e. Ibuprofen, tylenol, and tpump for back discomfort. PIV SL. Denies dizziness or lightheadedness when ambulating. Had a BM. SO at bedside, supportive.

## 2019-03-02 NOTE — PROGRESS NOTES
"PPD #2     Ready for discharge today.  Plans to room in, as baby on 5 day hold due to Yahaira cocaine use.  CPS to come Monday morning.    Yahaira has right back pain consistent with musculoskeletal.  Still has some mild BP elevations, intermittent.  No headache or visual changes.  No epigastric or RUQ pain.  OK to discharge.  Plans bottle feeding.    Labs yesterday wnl.  hgb ELP in 2012 julio, shows Hgb E, could cause very mild anemia.  Discussed with pt.    Plans to continue FE to treat iron deficiency anemia.    OBJECTIVE:  Blood pressure 134/82, temperature 98  F (36.7  C), temperature source Oral, resp. rate 16, height 1.588 m (5' 2.5\"), weight 68 kg (150 lb), unknown if currently breastfeeding.     19 -- -- -- -- -- -- -- -- -- 6 -- EK   19 -- -- -- -- -- -- -- -- -- 5 -- EK   19 -- -- 105 -- 134/82 -- -- -- -- -- -- EK   19 0010 98  F (36.7  C) -- 78 -- 142/90 16 -- -- -- 5 -- EK   19 -- -- -- -- -- -- -- -- -- 7 -- EK   198 -- -- -- -- -- -- -- -- -- 6 -- EK   19 1830 -- -- 90 -- 132/75 -- -- -- -- -- -- SO   19 1600 98  F (36.7  C) -- 93 -- 141/83 16 -- -- -- 3 -- SO   19 1351 -- -- -- -- -- -- -- -- -- 7 -- LD   19 1127 97.9  F (36.6  C) -- 92 -- 135/98 Abnormal  16 -- -- -- 6 -- LD   19 1020 -- -- -- -- -- -- -- -- -- 6 -- LD   19 1015 -- -- 92 -- 143/98 Abnormal  16 -- -- -- -- -- LD   19 0852 -- -- 96 -- 143/90 -- -- -- -- -- -- LD   19 0830 -- -- 95 -- 144/97 Abnormal  -- -- -- -- -- -- LD   19 0800 97.9  F (36.6  C) -- 93 -- 147/104 Abnormal  16 -- -- -- 5 -- LD   19 0749 -- -- -- -- -- -- -- -- -- 5 -- LD   19 0123 98.9  F (37.2  C) -- 96 -- 128/81 16 --            WDWN woman in NAD.  FF U -2    Hemoglobin   Date Value Ref Range Status   2019 8.1 (L) 11.7 - 15.7 g/dL Final   2019 7.9 (L) 11.7 - 15.7 g/dL Final       ASSESSMENT:  S/P  doing well. " Intermittent mild elevation in BP, should improve over time,.    PPD #2  Active Problems:    Encounter for triage in pregnant patient (2/28/2019)    Normal labor (2/28/2019)    Vaginal delivery (2/28/2019)  Anemia-  Continue FE  hgb E--no implications.  Cocaine use--baby on hold, CPS involved.    PLAN:  OK to discharge home today--she plans to room in.  Post partum restrictions and what to expect in puerperium reviewed. Rx Ibuprofen, fe, senna S, tylenol per her request.  RTC 6 weeks for post partum cares, pelvic rest until then.    Carlyn Varela MD March 2, 2019

## 2019-03-02 NOTE — PLAN OF CARE
BPs improving slightly this afternoon, 141/83 and 132/75. Pt denies all PIH symptoms. Labs WDL. MD aware, orders to notify if 150/100 x2 15 min apart. BP checks every 4 hrs.   C/o back pain, using Ibu and Tylenol and heat pad with some relief. Pt states she has chronic back pain. Pt is aware of POC to discharge tomorrow if BPs stable and baby to stay inpatient x5 days. Pt agreeable to this plan. FOB and 6yr old daughter here visiting. Positive boding behaviors observed. Bottlefeeding.

## 2019-03-02 NOTE — PLAN OF CARE
Pt will discharge to bed and board status and will remain with baby. Baby to stay x5 days to continue GLENN monitoring. Pt taking Ibu/Tylenol and using heat pad for back and muscle pain. Bottlefeeding baby. FOB and 7yr old daughter staying in room with pt. Discharge instructions reviewed and all questions answered. Home meds given of Ibu, Tylenol, Senna, and Iron. Pt knows s/s of pre-eclampsia to watch for at home and knows who to contact if questions. Social work and CPS to continue to follow baby and new POC to be made on Monday. Pt and FOB compliant with plan. Discharged to bed and board at 1225.

## 2019-03-04 ENCOUNTER — LACTATION ENCOUNTER (OUTPATIENT)
Age: 29
End: 2019-03-04

## 2019-03-04 NOTE — LACTATION NOTE
This note was copied from a baby's chart.  ALOK spoke with RN regarding [facility] policy with breastfeeding and drug use.  Unless otherwise indicated by the Pediatrician, it is not recommended for infant to breastfeed.  She has been formula feeding, but CPS agent had discussed possibility of patient breastfeeding with RN.

## 2019-03-05 LAB
AMPHETAMINES UR QL SCN: NEGATIVE
BZE UR QL: POSITIVE NG/ML
CANNABINOIDS UR QL: NEGATIVE
COCAINE UR CFM-MCNC: NEGATIVE NG/ML
COCAINE UR QL: ABNORMAL
COPATH REPORT: NORMAL
OPIATES UR QL SCN: NEGATIVE
PCP UR QL SCN: NEGATIVE

## 2021-06-05 ENCOUNTER — HEALTH MAINTENANCE LETTER (OUTPATIENT)
Age: 31
End: 2021-06-05

## 2021-09-25 ENCOUNTER — HEALTH MAINTENANCE LETTER (OUTPATIENT)
Age: 31
End: 2021-09-25

## 2022-07-02 ENCOUNTER — HEALTH MAINTENANCE LETTER (OUTPATIENT)
Age: 32
End: 2022-07-02

## 2023-01-07 ENCOUNTER — HEALTH MAINTENANCE LETTER (OUTPATIENT)
Age: 33
End: 2023-01-07

## 2023-07-15 ENCOUNTER — HEALTH MAINTENANCE LETTER (OUTPATIENT)
Age: 33
End: 2023-07-15